# Patient Record
Sex: MALE | Race: OTHER | NOT HISPANIC OR LATINO | URBAN - METROPOLITAN AREA
[De-identification: names, ages, dates, MRNs, and addresses within clinical notes are randomized per-mention and may not be internally consistent; named-entity substitution may affect disease eponyms.]

---

## 2017-09-29 ENCOUNTER — EMERGENCY (EMERGENCY)
Facility: HOSPITAL | Age: 47
LOS: 1 days | Discharge: HOME | End: 2017-09-29
Attending: EMERGENCY MEDICINE | Admitting: PEDIATRICS
Payer: OTHER MISCELLANEOUS

## 2017-09-29 VITALS
DIASTOLIC BLOOD PRESSURE: 92 MMHG | SYSTOLIC BLOOD PRESSURE: 141 MMHG | TEMPERATURE: 98 F | HEART RATE: 52 BPM | RESPIRATION RATE: 18 BRPM

## 2017-09-29 PROCEDURE — 73590 X-RAY EXAM OF LOWER LEG: CPT | Mod: 26,RT

## 2017-09-29 PROCEDURE — 99284 EMERGENCY DEPT VISIT MOD MDM: CPT

## 2017-09-29 RX ORDER — TETANUS TOXOID, REDUCED DIPHTHERIA TOXOID AND ACELLULAR PERTUSSIS VACCINE, ADSORBED 5; 2.5; 8; 8; 2.5 [IU]/.5ML; [IU]/.5ML; UG/.5ML; UG/.5ML; UG/.5ML
0.5 SUSPENSION INTRAMUSCULAR ONCE
Qty: 0 | Refills: 0 | Status: COMPLETED | OUTPATIENT
Start: 2017-09-29 | End: 2017-09-29

## 2017-09-29 RX ORDER — IBUPROFEN 200 MG
400 TABLET ORAL ONCE
Qty: 0 | Refills: 0 | Status: COMPLETED | OUTPATIENT
Start: 2017-09-29 | End: 2017-09-29

## 2017-09-29 RX ORDER — ACETAMINOPHEN 500 MG
650 TABLET ORAL ONCE
Qty: 0 | Refills: 0 | Status: COMPLETED | OUTPATIENT
Start: 2017-09-29 | End: 2017-09-29

## 2017-09-29 RX ADMIN — TETANUS TOXOID, REDUCED DIPHTHERIA TOXOID AND ACELLULAR PERTUSSIS VACCINE, ADSORBED 0.5 MILLILITER(S): 5; 2.5; 8; 8; 2.5 SUSPENSION INTRAMUSCULAR at 09:29

## 2017-09-29 RX ADMIN — Medication 650 MILLIGRAM(S): at 09:31

## 2017-09-29 RX ADMIN — Medication 400 MILLIGRAM(S): at 09:31

## 2017-09-29 NOTE — ED ADULT TRIAGE NOTE - CHIEF COMPLAINT QUOTE
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

## 2017-09-29 NOTE — ED PROVIDER NOTE - ATTENDING CONTRIBUTION TO CARE
Attending Note (Risa): patient complaining of right leg pain after injury at work. low suspicion fracture. abrasion noted on medial leg. pulses 2+ b/l.  ED read of xray reveals no acute pathology. alicia

## 2017-09-29 NOTE — ED ADULT NURSE NOTE - OBJECTIVE STATEMENT
Pt. A&Ox4, c/o right lower leg pain after leg was crushed between two metal plates. Laceration seen to the inner calf muscle, approx. 1cm in length. No active bleeding or drainage noted. + pulses and ROM. Awaiting xray. Meds given as ordered. Will continue to monitor.

## 2017-09-29 NOTE — ED PROVIDER NOTE - CHIEF COMPLAINT
The patient is a 47y Male complaining of The patient is a 47y Male complaining of RLE shin pain s/p crush injury

## 2017-09-29 NOTE — ED PROVIDER NOTE - CARE PLAN
Instructions for follow-up, activity and diet:	WBAT to RLE crutches as needed, wound care at home bacitracin or triple abx ointment at home with dsd Principal Discharge DX:	Abrasion  Instructions for follow-up, activity and diet:	WBAT to RLE crutches as needed, wound care at home bacitracin or triple abx ointment at home with dsd

## 2017-09-29 NOTE — ED PROVIDER NOTE - OBJECTIVE STATEMENT
Pt states that about an hour ago he was working when his right leg was crushed between 2 metal poles - points mid tibia.  Pt states it broke his skin along the shin.  Pt denies any numbness to the area, states that the pain at this time is about a 5/10, and that he tried to ambulate earlier and was able to put weight on his foot.  He states that he has not taken any medication for the pain, and that he has no currently treated medical conditions taking no medication.  Pt states that he has no significant surgical history.  Pt resting comfortably, NAD, AAOx3.  Denies any allergies.

## 2017-09-29 NOTE — ED PROVIDER NOTE - PHYSICAL EXAMINATION
RLE musculoskeletal: mild pain with palpation medial pretibial mid-1/3 shaft, soft tissue > osseous border at area of injury, active ROM 5/5 muscle power with DF/PF/inv/eversion about the level of the foot.  Skin: abrasion pretibial mid-1/3 shaft 1.6cm x 1.2cm superficial no active bleeding, no SOI, no erythema  Neuro: intact to surrounding area and foot, no numbness  Vasc: palpable pedal pulses, mild edema to surrounding area of injury

## 2017-09-29 NOTE — ED PROVIDER NOTE - PLAN OF CARE
WBAT to RLE crutches as needed, wound care at home bacitracin or triple abx ointment at home with dsd

## 2018-07-18 NOTE — ED PROVIDER NOTE - CPE EDP CARDIAC NORM
normal... How Severe Are Your Spot(S)?: moderate What Is The Reason For Today's Visit?: Full Body Skin Examination What Is The Reason For Today's Visit? (Being Monitored For X): concerning skin lesions on an annual basis

## 2023-10-16 NOTE — ED PROVIDER NOTE - SKIN, MLM
Skin normal color for race, warm, dry and intact. No evidence of rash. Advancement Flap (Double) Text: The defect edges were debeveled with a #15 scalpel blade.  Given the location of the defect and the proximity to free margins a double advancement flap was deemed most appropriate.  Using a sterile surgical marker, the appropriate advancement flaps were drawn incorporating the defect and placing the expected incisions within the relaxed skin tension lines where possible.    The area thus outlined was incised deep to adipose tissue with a #15 scalpel blade.  The skin margins were undermined to an appropriate distance in all directions utilizing iris scissors. Following this, the designed flap was advanced and carried over into the primary defect and sutured into place. The secondary defect was also sutured into place.